# Patient Record
Sex: FEMALE | Race: WHITE | URBAN - METROPOLITAN AREA
[De-identification: names, ages, dates, MRNs, and addresses within clinical notes are randomized per-mention and may not be internally consistent; named-entity substitution may affect disease eponyms.]

---
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
## 2021-01-08 ENCOUNTER — OFFICE VISIT (OUTPATIENT)
Dept: OBGYN CLINIC | Age: 21
End: 2021-01-08
Payer: COMMERCIAL

## 2021-01-08 VITALS
WEIGHT: 141 LBS | HEIGHT: 71 IN | DIASTOLIC BLOOD PRESSURE: 66 MMHG | BODY MASS INDEX: 19.74 KG/M2 | SYSTOLIC BLOOD PRESSURE: 110 MMHG

## 2021-01-08 DIAGNOSIS — Z01.419 ENCOUNTER FOR GYNECOLOGICAL EXAMINATION WITHOUT ABNORMAL FINDING: Primary | ICD-10-CM

## 2021-01-08 PROCEDURE — 99395 PREV VISIT EST AGE 18-39: CPT | Performed by: OBSTETRICS & GYNECOLOGY

## 2021-01-08 RX ORDER — NORETHINDRONE ACETATE AND ETHINYL ESTRADIOL, AND FERROUS FUMARATE 1MG-20(24)
KIT ORAL
Qty: 3 PACKAGE | Refills: 4 | Status: SHIPPED | OUTPATIENT
Start: 2021-01-08 | End: 2021-11-15

## 2021-01-08 NOTE — PATIENT INSTRUCTIONS
Combination Birth Control Pills: Care Instructions  Your Care Instructions     Combination birth control pills are used to prevent pregnancy. They give you a regular dose of the hormones estrogen and progestin. You take a hormone pill every day to prevent pregnancy. Birth control pills come in packs. The most common type has 3 weeks of hormone pills. Some packs have sugar pills (they do not contain any hormones) for the fourth week. During that fourth no-hormone week, you have your period. After the fourth week (28 days), you start a new pack. Some birth control pills are packaged in different ways. For example, some have hormone pills for the fourth week instead of sugar pills. Taking hormones for the entire month causes you to not have periods or to have fewer periods. Others are packaged so that you have a period every 3 months. Your doctor will tell you what type of pills you have. Follow-up care is a key part of your treatment and safety. Be sure to make and go to all appointments, and call your doctor if you are having problems. It's also a good idea to know your test results and keep a list of the medicines you take. How can you care for yourself at home? How do you take the pill? · Follow your doctor's instructions about when to start taking your pills. Use backup birth control, such as a condom, or don't have intercourse for 7 days after you start your pills. · Take your pills every day, at about the same time of day. To help yourself do this, try to take them when you do something else every day, such as brushing your teeth. What if you forget to take a pill? Always read the label for specific instructions, or call your doctor. Here are some basic guidelines:  · If you miss 1 hormone pill, take it as soon as you remember. Ask your doctor if you may need to use a backup birth control method, such as a condom, or not have intercourse.   · If you miss 2 or more hormone pills, take one as soon as you remember you forgot them. Then read the pill label or call your doctor about instructions on how to take your missed pills. Use a backup method of birth control or don't have intercourse for 7 days. Pregnancy is more likely if you miss more than 1 pill.  · If you had intercourse, you can use emergency contraception to help prevent pregnancy. The most effective emergency contraception is the copper IUD (inserted by a doctor). You can also get emergency contraceptive pills without a prescription at [facility].  What else do you need to know?   · The pill can have side effects.  ? You may have very light or skipped periods.  ? You may have bleeding between periods (spotting). This usually decreases after 3 to 4 months.  ? You may have mood changes, less interest in sex, or weight gain.  · The pill may reduce acne, heavy bleeding and cramping, and symptoms of premenstrual syndrome.  · Check with your doctor before you use any other medicines, including over-the-counter medicines, vitamins, herbal products, and supplements. Birth control hormones may not work as well to prevent pregnancy when combined with other medicines.  · The pill doesn't protect against sexually transmitted infection (STIs), such as herpes or HIV/AIDS. If you're not sure whether your sex partner might have an STI, use a condom to protect against disease.  When should you call for help?   Call your doctor now or seek immediate medical care if:    · You have severe belly pain.     · You have signs of a blood clot, such as:  ? Pain in your calf, back of the knee, thigh, or groin.  ? Redness and swelling in your leg or groin.     · You have blurred vision or other problems seeing.     · You have a severe headache.     · You have severe trouble breathing.   Watch closely for changes in your health, and be sure to contact your doctor if:    · You think you might be pregnant.     · You think you may be depressed.     · You think you may have  been exposed to or have a sexually transmitted infection. Where can you learn more? Go to http://www.gray.com/  Enter Z218 in the search box to learn more about \"Combination Birth Control Pills: Care Instructions. \"  Current as of: February 11, 2020               Content Version: 12.6  © 8770-7763 [employer]. Care instructions adapted under license by [employer] (which disclaims liability or warranty for this information). If you have questions about a medical condition or this instruction, always ask your healthcare professional. [address] any warranty or liability for your use of this information.

## 2021-01-08 NOTE — PROGRESS NOTES
Annual exam    Anita Becerril is a 24 y.o. presenting for annual exam.   Her main concerns today include refill on her birth control. Ob/Gyn Hx:    Patient's last menstrual period was 2020 (exact date). Menses- regular monthly cycles? no, Bothersome? no  Contraception-pills  STI- declined testing today  SA-yes    Health maintenance:  Pap-N/A  Mammo-N/A  Colonoscopy- N/A  Gardasil-3/3    History reviewed. No pertinent past medical history. Past Surgical History:   Procedure Laterality Date    HX WISDOM TEETH EXTRACTION         No family history on file.     Social History     Socioeconomic History    Marital status: SINGLE     Spouse name: Not on file    Number of children: Not on file    Years of education: Not on file    Highest education level: Not on file   Occupational History    Not on file   Social Needs    Financial resource strain: Not on file    Food insecurity     Worry: Not on file     Inability: Not on file    Transportation needs     Medical: Not on file     Non-medical: Not on file   Tobacco Use    Smoking status: Never Smoker    Smokeless tobacco: Never Used   Substance and Sexual Activity    Alcohol use: Yes     Frequency: Never     Comment: Social    Drug use: No    Sexual activity: Yes     Partners: Male     Birth control/protection: Pill   Lifestyle    Physical activity     Days per week: Not on file     Minutes per session: Not on file    Stress: Not on file   Relationships    Social connections     Talks on phone: Not on file     Gets together: Not on file     Attends Zoroastrian service: Not on file     Active member of club or organization: Not on file     Attends meetings of clubs or organizations: Not on file     Relationship status: Not on file    Intimate partner violence     Fear of current or ex partner: Not on file     Emotionally abused: Not on file     Physically abused: Not on file     Forced sexual activity: Not on file   Other Topics Concern    Not on file   Social History Narrative   • Not on file       Current Outpatient Medications   Medication Sig Dispense Refill   • Gemmily 1 mg-20 mcg (24)/75 mg (4) cap TAKE 1 TABLET BY MOUTH DAILY. 3 Package 4       No Known Allergies      Physical Exam    Visit Vitals  /66 (BP 1 Location: Left arm, BP Patient Position: Sitting)   Ht 5' 11\" (1.803 m)   Wt 141 lb (64 kg)   LMP 12/28/2020 (Exact Date)   BMI 19.67 kg/m²       Constitutional  · Appearance: well-nourished, well developed, alert, in no acute distress    HENT  · Head and Face: appears normal    Skin  · General Inspection: no rash, no lesions identified    Neurologic/Psychiatric  · Mental Status:  · Orientation: grossly oriented to person, place and time  · Mood and Affect: mood normal, affect appropriate      Assessment/Plan:  20 y.o. overall doing well.     Health Maintenance:  -counseled re: diet, exercise, healthy lifestyle  -pap/HPV- not of age.  -STI testing  DECLINED  -Gardasil- completed  OCPs refilled  COVID precautions    RTC: 1 year for annual wellness assessment, or sooner prn for problems or concerns  -handouts and instructions provided    Lauren Mcclure  1/8/2021  10:42 AM

## 2021-11-15 DIAGNOSIS — Z01.419 ENCOUNTER FOR GYNECOLOGICAL EXAMINATION WITHOUT ABNORMAL FINDING: ICD-10-CM

## 2021-11-15 RX ORDER — NORETHINDRONE ACETATE AND ETHINYL ESTRADIOL, AND FERROUS FUMARATE 1MG-20(24)
KIT ORAL
Qty: 3 DOSE PACK | Refills: 4 | Status: SHIPPED | OUTPATIENT
Start: 2021-11-15 | End: 2022-01-04 | Stop reason: SDUPTHER

## 2022-01-04 ENCOUNTER — OFFICE VISIT (OUTPATIENT)
Dept: OBGYN CLINIC | Age: 22
End: 2022-01-04
Payer: COMMERCIAL

## 2022-01-04 VITALS
BODY MASS INDEX: 21.7 KG/M2 | SYSTOLIC BLOOD PRESSURE: 98 MMHG | DIASTOLIC BLOOD PRESSURE: 60 MMHG | WEIGHT: 155 LBS | HEIGHT: 71 IN

## 2022-01-04 DIAGNOSIS — Z01.419 ENCOUNTER FOR GYNECOLOGICAL EXAMINATION WITHOUT ABNORMAL FINDING: Primary | ICD-10-CM

## 2022-01-04 DIAGNOSIS — R35.0 URINARY FREQUENCY: ICD-10-CM

## 2022-01-04 PROCEDURE — 99395 PREV VISIT EST AGE 18-39: CPT | Performed by: OBSTETRICS & GYNECOLOGY

## 2022-01-04 RX ORDER — NORETHINDRONE ACETATE AND ETHINYL ESTRADIOL, AND FERROUS FUMARATE 1MG-20(24)
KIT ORAL
Qty: 3 DOSE PACK | Refills: 4 | Status: SHIPPED | OUTPATIENT
Start: 2022-01-04

## 2022-01-04 NOTE — PROGRESS NOTES
Annual exam ages 21-44    Rosendo Granados is a [de-identified] ,  22 y.o. female WHITE/NON- No LMP recorded. (Menstrual status: Chemically Induced). .    She presents for her annual checkup. She is having no significant problems. Having burning, irritation, pain, frequency with urination. She was recently treated for a UTI 2 weeks ago with cipro, finished the course about a week ago. Doing well on ocp    With regard to the Gardasil vaccine, she received 3 of the 3 injections. Menstrual status:    Her periods are moderate in flow. She is using three to five pads or tampons per day. Her periods come about two times a year. She denies dysmenorrhea. She reports no premenstrual symptoms. Contraception:    The current method of family planning is OCP (estrogen/progesterone). Sexual history:     She  reports being sexually active and has had partner(s) who are male. She reports using the following method of birth control/protection: Pill. .    Medical conditions:    Since her last annual GYN exam about one year ago, she has not the following changes in her health history: none. Pap and Mammogram History:    No pap history. Breast Cancer History/Substance Abuse: negative    History reviewed. No pertinent past medical history. Past Surgical History:   Procedure Laterality Date    HX WISDOM TEETH EXTRACTION         Current Outpatient Medications   Medication Sig Dispense Refill    norethindrone-e.estradioL-iron 1 mg-20 mcg (24)/75 mg (4) cap TAKE 1 TABLET BY MOUTH EVERY DAY 3 Dose Pack 4     Allergies: Patient has no known allergies. Tobacco History:  reports that she has never smoked. She has never used smokeless tobacco.  Alcohol Abuse:  reports current alcohol use. Drug Abuse:  reports no history of drug use. Family Medical/Cancer History: No family history on file.      Review of Systems - History obtained from the patient  Constitutional: negative for weight loss, fever, night sweats  HEENT: negative for hearing loss, earache, congestion, snoring, sorethroat  CV: negative for chest pain, palpitations, edema  Resp: negative for cough, shortness of breath, wheezing  GI: negative for change in bowel habits, abdominal pain, black or bloody stools  : negative for frequency, dysuria, hematuria, vaginal discharge  MSK: negative for back pain, joint pain, muscle pain  Breast: negative for breast lumps, nipple discharge, galactorrhea  Skin :negative for itching, rash, hives  Neuro: negative for dizziness, headache, confusion, weakness  Psych: negative for anxiety, depression, change in mood  Heme/lymph: negative for bleeding, bruising, pallor    Physical Exam    Visit Vitals  BP 98/60   Ht 5' 11\" (1.803 m)   Wt 155 lb (70.3 kg)   BMI 21.62 kg/m²       Constitutional  · Appearance: well-nourished, well developed, alert, in no acute distress    HENT  · Head and Face: appears normal    Chest  · Respiratory Effort: breathing unlabored    Breasts  · Inspection of Breasts: breasts symmetrical, no skin changes, no discharge present, nipple appearance normal, no skin retraction present  · Palpation of Breasts and Axillae: no masses present on palpation, no breast tenderness  · Axillary Lymph Nodes: no lymphadenopathy present    Gastrointestinal  · Abdominal Examination: abdomen non-tender to palpation, normal bowel sounds, no masses present  · Liver and spleen: no hepatomegaly present, spleen not palpable  · Hernias: no hernias identified      Skin  · General Inspection: no rash, no lesions identified    Neurologic/Psychiatric  · Mental Status:  · Orientation: grossly oriented to person, place and time  · Mood and Affect: mood normal, affect appropriate    . Assessment:  Routine gynecologic examination; doing well on ocp  Recent UTI  Her current medical status is satisfactory with no evidence of significant gynecologic issues.     Plan:  GC/ chlamydia offered and declined   urine SHIRA  Patient declines presence of chaperone during today's visit.    Pap not done today  Counseled re: diet, exercise, healthy lifestyle  Return for yearly wellness visits  Gardisil counseling provided  Rec screening mammo at either 36 or 41

## 2022-01-06 LAB
BACTERIA UR CULT: NORMAL
SPECIMEN STATUS REPORT, ROLRST: NORMAL

## 2023-01-05 ENCOUNTER — OFFICE VISIT (OUTPATIENT)
Dept: OBGYN CLINIC | Age: 23
End: 2023-01-05

## 2023-01-05 VITALS — DIASTOLIC BLOOD PRESSURE: 70 MMHG | BODY MASS INDEX: 21.34 KG/M2 | WEIGHT: 153 LBS | SYSTOLIC BLOOD PRESSURE: 124 MMHG

## 2023-01-05 DIAGNOSIS — Z01.419 ENCOUNTER FOR GYNECOLOGICAL EXAMINATION WITHOUT ABNORMAL FINDING: ICD-10-CM

## 2023-01-05 DIAGNOSIS — Z01.419 WELL WOMAN EXAM WITH ROUTINE GYNECOLOGICAL EXAM: Primary | ICD-10-CM

## 2023-01-05 PROCEDURE — 99395 PREV VISIT EST AGE 18-39: CPT | Performed by: OBSTETRICS & GYNECOLOGY

## 2023-01-05 RX ORDER — NORETHINDRONE ACETATE AND ETHINYL ESTRADIOL, AND FERROUS FUMARATE 1MG-20(24)
KIT ORAL
Qty: 3 DOSE PACK | Refills: 4 | Status: SHIPPED | OUTPATIENT
Start: 2023-01-05

## 2023-01-05 NOTE — PROGRESS NOTES
Annual exam ages 22-44    Sal Henderson is a [de-identified] P5,  25 y.o. female WHITE/NON- No LMP recorded. (Menstrual status: Chemically Induced). .    She presents for her annual checkup. She is having no significant problems. [facility] at [place]  Likes her ocp    With regard to the Gardasil vaccine, she  received all 3    Menstrual status:    Her periods are spotting in flow due to oral contraceptive pills. She denies dysmenorrhea. She reports no premenstrual symptoms. Contraception:    The current method of family planning is OCP (estrogen/progesterone). Sexual history:     She  reports being sexually active and has had partner(s) who are male. She reports using the following method of birth control/protection: Pill. .    Medical conditions:    Since her last annual GYN exam about one year ago, she has not the following changes in her health history: none. Pap and Mammogram History:    23yo      Breast Cancer History/Substance Abuse: negative    No past medical history on file. Past Surgical History:   Procedure Laterality Date    HX WISDOM TEETH EXTRACTION         Current Outpatient Medications   Medication Sig Dispense Refill    norethindrone-e.estradioL-iron 1 mg-20 mcg (24)/75 mg (4) cap TAKE 1 TABLET BY MOUTH EVERY DAY 3 Dose Pack 4     Allergies: Patient has no known allergies. Tobacco History:  reports that she has never smoked. She has never used smokeless tobacco.  Alcohol Abuse:  reports current alcohol use. Drug Abuse:  reports no history of drug use. Family Medical/Cancer History: No family history on file.      Review of Systems - History obtained from the patient  Constitutional: negative for weight loss, fever, night sweats  HEENT: negative for hearing loss, earache, congestion, snoring, sorethroat  CV: negative for chest pain, palpitations, edema  Resp: negative for cough, shortness of breath, wheezing  GI: negative for change in bowel habits, abdominal pain, black or bloody stools  : negative for frequency, dysuria, hematuria, vaginal discharge  MSK: negative for back pain, joint pain, muscle pain  Breast: negative for breast lumps, nipple discharge, galactorrhea  Skin :negative for itching, rash, hives  Neuro: negative for dizziness, headache, confusion, weakness  Psych: negative for anxiety, depression, change in mood  Heme/lymph: negative for bleeding, bruising, pallor    Physical Exam    Visit Vitals  /70   Wt 153 lb (69.4 kg)   BMI 21.34 kg/m²       Constitutional  Appearance: well-nourished, well developed, alert, in no acute distress    HENT  Head and Face: appears normal    Chest  Respiratory Effort: breathing unlabored    Breasts  Inspection of Breasts: breasts symmetrical, no skin changes, no discharge present, nipple appearance normal, no skin retraction present  Palpation of Breasts and Axillae: no masses present on palpation, no breast tenderness  Axillary Lymph Nodes: no lymphadenopathy present    Gastrointestinal  Abdominal Examination: abdomen non-tender to palpation, normal bowel sounds, no masses present  Liver and spleen: no hepatomegaly present, spleen not palpable  Hernias: no hernias identified    Genitourinary   deferred       Skin  General Inspection: no rash, no lesions identified    Neurologic/Psychiatric  Mental Status:  Orientation: grossly oriented to person, place and time  Mood and Affect: mood normal, affect appropriate    . Assessment:  Routine gynecologic examination  Her current medical status is satisfactory with no evidence of significant gynecologic issues. Plan:  GC/ chlamydia offered and declined   Script ocp  Patient declines presence of chaperone during today's visit.    Pap not done today; will do pap next year  Counseled re: diet, exercise, healthy lifestyle  Return for yearly wellness visits  Gardisil counseling provided  Rec screening mammo at either 36 or 41

## 2024-01-05 ENCOUNTER — OFFICE VISIT (OUTPATIENT)
Age: 24
End: 2024-01-05
Payer: COMMERCIAL

## 2024-01-05 VITALS — SYSTOLIC BLOOD PRESSURE: 112 MMHG | WEIGHT: 157 LBS | BODY MASS INDEX: 21.9 KG/M2 | DIASTOLIC BLOOD PRESSURE: 68 MMHG

## 2024-01-05 DIAGNOSIS — Z01.419 WELL WOMAN EXAM: Primary | ICD-10-CM

## 2024-01-05 DIAGNOSIS — Z11.3 SCREENING FOR VENEREAL DISEASE: ICD-10-CM

## 2024-01-05 PROCEDURE — 99395 PREV VISIT EST AGE 18-39: CPT | Performed by: OBSTETRICS & GYNECOLOGY

## 2024-01-05 RX ORDER — NORETHINDRONE ACETATE AND ETHINYL ESTRADIOL, AND FERROUS FUMARATE 1MG-20(24)
1 KIT ORAL DAILY
Qty: 3 PACKET | Refills: 3 | Status: SHIPPED | OUTPATIENT
Start: 2024-01-05

## 2024-01-05 NOTE — PROGRESS NOTES
Annual exam    Zulma Medina is a 23 y.o. presenting for annual exam. Graduates this May from [employer].  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  Doing well on ocp      Ob/Gyn Hx:  G P  -    LMP - No LMP recorded.  Menses - regular;with the following concerns: negative  Contraception - oral contraceptives (estrogen/progesterone)  STI -   SA -     Health maintenance:  Pap - first today.  Gardasil - series completed      No past medical history on file.    Past Surgical History:   Procedure Laterality Date    WISDOM TOOTH EXTRACTION         No family history on file.    Social History     Socioeconomic History    Marital status: Single     Spouse name: Not on file    Number of children: Not on file    Years of education: Not on file    Highest education level: Not on file   Occupational History    Not on file   Tobacco Use    Smoking status: Never    Smokeless tobacco: Never   Substance and Sexual Activity    Alcohol use: Yes     Alcohol/week: 8.0 standard drinks of alcohol     Types: 8 Glasses of wine per week    Drug use: No    Sexual activity: Yes     Partners: Male     Birth control/protection: Pill   Other Topics Concern    Not on file   Social History Narrative    Not on file     Social Determinants of Health     Financial Resource Strain: Not on file   Food Insecurity: Not on file   Transportation Needs: Not on file   Physical Activity: Not on file   Stress: Not on file   Social Connections: Not on file   Intimate Partner Violence: Not on file   Housing Stability: Not on file       Current Outpatient Medications   Medication Sig Dispense Refill    Norethin Ace-Eth Estrad-FE 1-20 MG-MCG(24) CAPS Take 1 tablet by mouth daily       No current facility-administered medications for this visit.       Not on File    Review of Systems - History obtained from the patient  Constitutional: negative for weight loss, fever, night sweats  HEENT: negative for hearing loss, earache, congestion, snoring,

## 2024-01-09 LAB
., LABCORP: NORMAL
C TRACH RRNA CVX QL NAA+PROBE: NEGATIVE
CYTOLOGIST CVX/VAG CYTO: NORMAL
CYTOLOGY CVX/VAG DOC CYTO: NORMAL
CYTOLOGY CVX/VAG DOC THIN PREP: NORMAL
DX ICD CODE: NORMAL
Lab: NORMAL
N GONORRHOEA RRNA CVX QL NAA+PROBE: NEGATIVE
OTHER STN SPEC: NORMAL
STAT OF ADQ CVX/VAG CYTO-IMP: NORMAL
T VAGINALIS RRNA SPEC QL NAA+PROBE: NEGATIVE

## 2024-01-22 RX ORDER — NORETHINDRONE ACETATE AND ETHINYL ESTRADIOL, AND FERROUS FUMARATE 1MG-20(24)
1 KIT ORAL DAILY
Qty: 84 CAPSULE | Refills: 0 | OUTPATIENT
Start: 2024-01-22

## 2024-01-23 RX ORDER — NORETHINDRONE ACETATE AND ETHINYL ESTRADIOL, AND FERROUS FUMARATE 1MG-20(24)
1 KIT ORAL DAILY
Qty: 84 CAPSULE | Refills: 0 | OUTPATIENT
Start: 2024-01-23

## 2024-01-24 RX ORDER — NORETHINDRONE ACETATE AND ETHINYL ESTRADIOL, AND FERROUS FUMARATE 1MG-20(24)
1 KIT ORAL DAILY
Qty: 3 PACKET | Refills: 3 | OUTPATIENT
Start: 2024-01-24

## 2024-05-30 RX ORDER — NORETHINDRONE ACETATE AND ETHINYL ESTRADIOL, AND FERROUS FUMARATE 1MG-20(24)
KIT ORAL
Qty: 84 CAPSULE | Refills: 0 | OUTPATIENT
Start: 2024-05-30

## 2024-08-15 RX ORDER — NORETHINDRONE ACETATE AND ETHINYL ESTRADIOL, AND FERROUS FUMARATE 1MG-20(24)
1 KIT ORAL DAILY
Qty: 3 PACKET | Refills: 3 | Status: SHIPPED | OUTPATIENT
Start: 2024-08-15